# Patient Record
Sex: FEMALE | Race: BLACK OR AFRICAN AMERICAN
[De-identification: names, ages, dates, MRNs, and addresses within clinical notes are randomized per-mention and may not be internally consistent; named-entity substitution may affect disease eponyms.]

---

## 2019-09-30 ENCOUNTER — HOSPITAL ENCOUNTER (INPATIENT)
Dept: HOSPITAL 74 - YASAS | Age: 64
LOS: 10 days | Discharge: HOME | DRG: 895 | End: 2019-10-10
Attending: NEUROMUSCULOSKELETAL MEDICINE & OMM | Admitting: NEUROMUSCULOSKELETAL MEDICINE & OMM
Payer: COMMERCIAL

## 2019-09-30 VITALS — BODY MASS INDEX: 28.2 KG/M2

## 2019-09-30 DIAGNOSIS — R05: ICD-10-CM

## 2019-09-30 DIAGNOSIS — Z91.013: ICD-10-CM

## 2019-09-30 DIAGNOSIS — F32.9: ICD-10-CM

## 2019-09-30 DIAGNOSIS — F10.20: Primary | ICD-10-CM

## 2019-09-30 DIAGNOSIS — M54.5: ICD-10-CM

## 2019-09-30 DIAGNOSIS — I10: ICD-10-CM

## 2019-09-30 DIAGNOSIS — M25.561: ICD-10-CM

## 2019-09-30 DIAGNOSIS — F14.20: ICD-10-CM

## 2019-09-30 DIAGNOSIS — F12.20: ICD-10-CM

## 2019-09-30 DIAGNOSIS — G89.29: ICD-10-CM

## 2019-09-30 DIAGNOSIS — F11.20: ICD-10-CM

## 2019-09-30 DIAGNOSIS — F17.210: ICD-10-CM

## 2019-09-30 PROCEDURE — HZ42ZZZ GROUP COUNSELING FOR SUBSTANCE ABUSE TREATMENT, COGNITIVE-BEHAVIORAL: ICD-10-PCS | Performed by: ALLERGY & IMMUNOLOGY

## 2019-09-30 RX ADMIN — Medication SCH MG: at 21:33

## 2019-09-30 NOTE — HP
CIWA Score





- Admission Criteria


[facility] Guidelines: Admission for Medically Managed Detox: 


Requires at least one of the followin. CIWA greater than 12


2. Seizures within the past 24 hours


3. Delirium tremens within the past 24 hours


4. Hallucinations within the past 24 hours


5. Acute intervention needed for co  occurring medical disorder


6. Acute intervention needed for co  occurring psychiatric disorder


7. Severe withdrawal that cannot be handled at a lower level of care (continued


    vomiting, continued diarrhea, abnormal vital signs) requiring intravenous


    medication and/or fluids


8. Pregnancy








Admission ROS [facility]





- HPI


Chief Complaint: 





Here for rehab. To get stable. 


Allergies/Adverse Reactions: 


 Allergies











Allergy/AdvReac Type Severity Reaction Status Date / Time


 


Fish Containing Products Allergy Severe Difficulty Verified 19 14:39





   Breathing  


 


No Known Drug Allergies Allergy   Verified 19 17:19











History of Present Illness: 





First admission for this 63 yo who presents w/ a hx of poly-substance abuse, 

post 5 day detox at [facility] discharged today and seeking rehab.





CXRay in [facility]: Neg - 2019.





Heroin use began at age 62. Nasal. Was using about 10 bags/day. Last use . 


Crack/Cocaine use began at age 31. Smoked daily. Last use 19. 


Marijuana use began at age 17. Smoked daily. Last use 19. 


Alcohol use began at age 7. Was drinking 1 qt liquor daily. Last use 19. 





Nicotine use began at age [age]. Smokes 1PPD. 








Denies hx seizures, blackouts, or overdoses. Has a Narcan kit.





PMHx: HTN: Herniated discs w/ LBP; (R) knee pain. 





MHHx: Anxiety and Depression. Denies thoughts of harming self or others. 

Encouraged to make an appt w/ MH Provider while in rehab for follow-up post 

discharge.





SHx: Undomiciled. Retired. 














Patient Name: Daina Paige YOB: 1955


 


Address: [address] [place] [address] Sex: Female














 Rx Written Rx Dispensed Drug Quantity Days Supply Prescriber Name  


 


10/09/2018 10/25/2018 oxycodone hcl 20 mg tablet  90 30 Wero Mello MD  








Search Terms: Daina Paige, 1955 


Search Date: 2019 05:08:38 PM 


States Searched: [place], [place], [place], [place], [place], [place], [place] 


The Drug Utilization Report below displays the controlled substance 

prescriptions, if any, that were dispensed in the indicated state(s). The 

information displayed on this report is compiled from requests submitted to 

other states' PMPs, and accurately reflects the information as returned by 

them. Blank fields indicate data not provided by other state.


This report was requested by: Krissy Valles | Reference #: [id number] 








Exam Limitations: No Limitations





- Ebola screening


Have you traveled outside of the country in the last 21 days: No


Have you had contact with anyone from an Ebola affected area: No


Have you been sick,other than usual withdrawal symptoms: No


Do you have a fever: No





- Review of Systems


Constitutional: Diaphoresis (At night), Changes in sleep (Difficulty staying 

asleep)


EENT: reports: Blurred Vision, Dental Problems (Missing teeth. No pain. Chews 

and swallows ok.), Other (Post-nasal drip)


Respiratory: reports: No Symptoms reported


Cardiac: reports: No Symptoms Reported


GI: reports: Indigestion (Heart burn w/ certain foods)


: reports: No Symptoms Reported


Musculoskeletal: reports: Back Pain (Chronic LB tight pain. Discomfot ="6/10

"Increases w/ standing and turning in certain positions. Improves w/ resting.), 

Joint Pain (Hx: (R) knee, ususally sharp pain: Now is a "5". Increases w/ 

standing. Improves w/ laying down.), Other (Uses a cane for support.)


Integumentary: reports: No Symptoms Reported


Neuro: reports: Unsteady Gait (Uses a cane for support.)


Endocrine: reports: No Symptoms Reported


Hematology: reports: No Symptoms Reported


Psychiatric: reports: Judgement Intact, Orientated x3, Anxious, Depressed (

Denies thoughts of harming self or others)





Patient History





- PPD History


Previous Implant?: Yes


Documented Results: Negative w/o proof


Implanted On Prior [facility] Admission?: No


PPD to be Administered?: Yes





- Reproductive History


Patient is a Female of Child Bearing Age (11 -55 yrs old): No





- Smoking Cessation


Smoking history: Current every day smoker


Have you smoked in the past 12 months: Yes


Aproximately how many cigarettes per day: 20


Hx Chewing Tobacco Use: Yes


Initiated information on smoking cessation: No


'Breaking Loose' booklet given: 19





- Substance & Tx. History


Hx Alcohol Use: Yes


Hx Substance Use: Yes


Substance Use Type: Alcohol, Cocaine, Heroin, Marijuana


Hx Substance Use Treatment: Yes (detox, rehabs, past MMTP (5 mths ago))





- Substances abused


  ** Alcohol


Substance route: Oral


Frequency: Daily


Amount used: 1 qt vodka


Age of first use: 7


Date of last use: 19





  ** Crack


Substance route: Smoking


Frequency: Daily


Amount used: $100


Age of first use: 31


Date of last use: 19





  ** Marijuana/Hashish


Substance route: Smoking


Frequency: Daily


Amount used: $25


Age of first use: 17


Date of last use: 19





  ** Heroin


Substance route: Inhalation


Frequency: No use in 30 days


Amount used: 10 bags


Age of first use: 62


Date of last use: 19





Admission Physical Exam [facility]





- Vital Signs


Vital Signs: 


 Vital Signs - 24 hr











  19





  14:44


 


Temperature 97.9 F


 


Pulse Rate 64


 


Respiratory 18





Rate 


 


Blood Pressure 147/72














- Physical


General Appearance: Yes: Nourished, Anxious


HEENTM: Yes: EOMI, Hearing grossly Normal, Normal ENT Inspection, Normocephalic

, Normal Voice, KRISTINA, Pharynx Normal


Respiratory: Yes: Lungs Clear (Pulse Ox = 99 %), Normal Breath Sounds, No 

Respiratory Distress, Other (Cough productive of thick grayish phlegm.)


Neck: Yes: No masses,lesions,Nodules, Supple


Breast: Yes: Breast Exam Deferred


Cardiology: Yes: Regular Rhythm, S1, S2, Bradycardia (HR: 56)


Abdominal: Yes: Non Tender, Soft, Increased Bowel Sounds


Genitourinary: Yes: Within Normal Limits


Back: Yes: Normal Inspection


Musculoskeletal: Yes: Gait Steady (w/ use of cane), Joint Stiffness ((R) knee 

stiffness. No swelling.)


Extremities: Yes: Normal Capillary Refill


Neurological: Yes: CNs II-XII NML intact, Fully Oriented, Alert, Motor Strength 

5/5, Normal Mood/Affect, Normal Response


Integumentary: Yes: Normal Color, Dry, Warm


Lymphatic: Yes: Within Normal Limits





- Diagnostic


(1) Heroin use disorder, moderate, in early remission


Current Visit: Yes   Status: Acute   





(2) Alcohol use disorder, moderate, in early remission


Current Visit: Yes   Status: Acute   





(3) Cocaine use disorder, moderate, in early remission


Current Visit: Yes   Status: Acute   





(4) Moderate cannabis dependence in early remission


Current Visit: Yes   Status: Acute   





(5) Nicotine dependence, uncomplicated


Current Visit: Yes   Status: Chronic   


Qualifiers: 


   Nicotine product type: cigarettes   Qualified Code(s): F17.210 - Nicotine 

dependence, cigarettes, uncomplicated   





(6) Essential (primary) hypertension


Current Visit: Yes   Status: Acute   





(7) History of low back pain


Current Visit: Yes   Status: Chronic   





(8) Cough


Current Visit: Yes   Status: Chronic   Comment: Pulse Ox = 99%. Afebrile. No SOB

/wheeze.    





(9) Chronic pain of right knee


Current Visit: Yes   Status: Chronic   





Cleared for Admission [facility]





- Detox or Rehab


Claeared for Rehab Admission: Yes





Breathalyzer





- Breathalyzer


Breathalyzer: 0





Urine Drug Screen





- Test Device


Lot number: [id number]


Expiration date: 21





- Control


Is test valid?: Yes





- Results


Drug screen NEGATIVE: No


Urine drug screen results: BZO-Benzodiazepines





Inpatient Rehab Admission





- Rehab Decision to Admit


Inpatient rehab admission?: Yes





- Initial Determination


Are CD services needed?: Yes


Free of communicable disease: Yes


Not in need of hospitalization: Yes





- Rehab Admission Criteria


Previous failed treatment: Yes


Poor recovery environment: Yes


Comorbidities: Yes


Lacks judgement: Yes


Patient is meeting Inpatient Rehab admission criteria:: Yes

## 2019-10-01 LAB
ALBUMIN SERPL-MCNC: 4 G/DL (ref 3.4–5)
ALP SERPL-CCNC: 60 U/L (ref 45–117)
ALT SERPL-CCNC: 50 U/L (ref 13–61)
ANION GAP SERPL CALC-SCNC: 6 MMOL/L (ref 8–16)
APPEARANCE UR: CLEAR
AST SERPL-CCNC: 29 U/L (ref 15–37)
BILIRUB SERPL-MCNC: 0.6 MG/DL (ref 0.2–1)
BILIRUB UR STRIP.AUTO-MCNC: NEGATIVE MG/DL
BUN SERPL-MCNC: 14.8 MG/DL (ref 7–18)
CALCIUM SERPL-MCNC: 9.2 MG/DL (ref 8.5–10.1)
CHLORIDE SERPL-SCNC: 101 MMOL/L (ref 98–107)
CO2 SERPL-SCNC: 31 MMOL/L (ref 21–32)
COLOR UR: YELLOW
CREAT SERPL-MCNC: 0.6 MG/DL (ref 0.55–1.3)
DEPRECATED RDW RBC AUTO: 13.7 % (ref 11.6–15.6)
GLUCOSE SERPL-MCNC: 77 MG/DL (ref 74–106)
HCT VFR BLD CALC: 40.9 % (ref 32.4–45.2)
HGB BLD-MCNC: 14.1 GM/DL (ref 10.7–15.3)
KETONES UR QL STRIP: NEGATIVE
LEUKOCYTE ESTERASE UR QL STRIP.AUTO: NEGATIVE
MCH RBC QN AUTO: 33.5 PG (ref 25.7–33.7)
MCHC RBC AUTO-ENTMCNC: 34.4 G/DL (ref 32–36)
MCV RBC: 97.2 FL (ref 80–96)
NITRITE UR QL STRIP: NEGATIVE
PH UR: 7.5 [PH] (ref 5–8)
PLATELET # BLD AUTO: 297 K/MM3 (ref 134–434)
PMV BLD: 7.1 FL (ref 7.5–11.1)
POTASSIUM SERPLBLD-SCNC: 5 MMOL/L (ref 3.5–5.1)
PROT SERPL-MCNC: 7.4 G/DL (ref 6.4–8.2)
PROT UR QL STRIP: NEGATIVE
PROT UR QL STRIP: NEGATIVE
RBC # BLD AUTO: 4.2 M/MM3 (ref 3.6–5.2)
SODIUM SERPL-SCNC: 138 MMOL/L (ref 136–145)
SP GR UR: 1.01 (ref 1.01–1.03)
UROBILINOGEN UR STRIP-MCNC: 0.2 MG/DL (ref 0.2–1)
WBC # BLD AUTO: 7.4 K/MM3 (ref 4–10)

## 2019-10-01 RX ADMIN — GUAIFENESIN SCH ML: 100 SOLUTION ORAL at 23:06

## 2019-10-01 RX ADMIN — HYDROXYZINE PAMOATE PRN MG: 25 CAPSULE ORAL at 15:18

## 2019-10-01 RX ADMIN — GUAIFENESIN SCH: 100 SOLUTION ORAL at 00:49

## 2019-10-01 RX ADMIN — Medication SCH TAB: at 09:40

## 2019-10-01 RX ADMIN — GUAIFENESIN SCH ML: 100 SOLUTION ORAL at 12:05

## 2019-10-01 RX ADMIN — Medication SCH APPLIC: at 18:26

## 2019-10-01 RX ADMIN — GUAIFENESIN SCH ML: 100 SOLUTION ORAL at 18:29

## 2019-10-01 RX ADMIN — NICOTINE SCH MG: 14 PATCH, EXTENDED RELEASE TRANSDERMAL at 09:40

## 2019-10-01 RX ADMIN — Medication SCH MG: at 21:17

## 2019-10-01 RX ADMIN — GUAIFENESIN SCH: 100 SOLUTION ORAL at 06:40

## 2019-10-01 RX ADMIN — Medication PRN MG: at 21:17

## 2019-10-01 NOTE — EKG
Test Reason : 

Blood Pressure : ***/*** mmHG

Vent. Rate : 053 BPM     Atrial Rate : 053 BPM

   P-R Int : 152 ms          QRS Dur : 086 ms

    QT Int : 484 ms       P-R-T Axes : 058 059 059 degrees

   QTc Int : 454 ms

 

SINUS BRADYCARDIA

OTHERWISE NORMAL ECG

NO PREVIOUS ECGS AVAILABLE

Confirmed by Jace Starks MD ([id number]) on 10/1/2019 10:14:08 AM

 

Referred By:             Confirmed By:Jace Starks MD
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
## 2019-10-01 NOTE — PN
[facility] Progress Note


Note: 





Pt has a h/o HTN. Was not taking medications for the last 2 years- thinks it 

was norvasc. Was also on Lisinopril in the past- lost weight and so had meds 

adjusted.


will start on Norvasc 5mg and go up on dose and will add other meds as needed





 Vital Signs - 24 hr











  09/30/19 10/01/19 10/01/19





  14:44 00:30 03:30


 


Temperature 97.9 F  


 


Pulse Rate 64  


 


Respiratory 18 18 18





Rate   


 


Blood Pressure 147/72  














  10/01/19 10/01/19





  06:38 06:54


 


Temperature 98.3 F 98.3 F


 


Pulse Rate 54 L 54 L


 


Respiratory 18 18





Rate  


 


Blood Pressure 170/80 170/80

## 2019-10-02 LAB
RPR: (no result)
TREPONEMA ANTIBODY: REACTIVE

## 2019-10-02 RX ADMIN — Medication PRN MG: at 21:20

## 2019-10-02 RX ADMIN — Medication SCH TAB: at 09:38

## 2019-10-02 RX ADMIN — NICOTINE SCH: 14 PATCH, EXTENDED RELEASE TRANSDERMAL at 09:38

## 2019-10-02 RX ADMIN — GUAIFENESIN SCH: 100 SOLUTION ORAL at 06:38

## 2019-10-02 RX ADMIN — Medication SCH MG: at 21:19

## 2019-10-02 RX ADMIN — SERTRALINE HYDROCHLORIDE SCH MG: 50 TABLET ORAL at 09:39

## 2019-10-02 RX ADMIN — Medication SCH: at 09:38

## 2019-10-02 RX ADMIN — HYDROXYZINE PAMOATE PRN MG: 25 CAPSULE ORAL at 21:19

## 2019-10-02 RX ADMIN — AMLODIPINE BESYLATE SCH MG: 10 TABLET ORAL at 09:39

## 2019-10-03 RX ADMIN — NICOTINE SCH: 14 PATCH, EXTENDED RELEASE TRANSDERMAL at 09:43

## 2019-10-03 RX ADMIN — AMLODIPINE BESYLATE SCH MG: 10 TABLET ORAL at 09:42

## 2019-10-03 RX ADMIN — SERTRALINE HYDROCHLORIDE SCH MG: 50 TABLET ORAL at 09:43

## 2019-10-03 RX ADMIN — Medication PRN MG: at 21:06

## 2019-10-03 RX ADMIN — Medication SCH MG: at 21:06

## 2019-10-03 RX ADMIN — Medication SCH: at 09:42

## 2019-10-03 RX ADMIN — HYDROXYZINE PAMOATE PRN MG: 25 CAPSULE ORAL at 21:05

## 2019-10-03 RX ADMIN — Medication SCH TAB: at 09:42

## 2019-10-04 RX ADMIN — Medication SCH: at 09:37

## 2019-10-04 RX ADMIN — AMLODIPINE BESYLATE SCH MG: 10 TABLET ORAL at 09:36

## 2019-10-04 RX ADMIN — SERTRALINE HYDROCHLORIDE SCH MG: 50 TABLET ORAL at 09:36

## 2019-10-04 RX ADMIN — NICOTINE SCH: 14 PATCH, EXTENDED RELEASE TRANSDERMAL at 09:38

## 2019-10-04 RX ADMIN — Medication SCH MG: at 21:07

## 2019-10-04 RX ADMIN — HYDROXYZINE PAMOATE PRN MG: 25 CAPSULE ORAL at 21:07

## 2019-10-04 RX ADMIN — Medication PRN MG: at 21:07

## 2019-10-04 RX ADMIN — Medication SCH TAB: at 09:36

## 2019-10-05 RX ADMIN — NICOTINE SCH: 14 PATCH, EXTENDED RELEASE TRANSDERMAL at 09:40

## 2019-10-05 RX ADMIN — Medication SCH: at 09:40

## 2019-10-05 RX ADMIN — Medication PRN MG: at 21:46

## 2019-10-05 RX ADMIN — Medication SCH TAB: at 09:41

## 2019-10-05 RX ADMIN — SERTRALINE HYDROCHLORIDE SCH MG: 50 TABLET ORAL at 09:41

## 2019-10-05 RX ADMIN — AMLODIPINE BESYLATE SCH MG: 10 TABLET ORAL at 09:41

## 2019-10-05 RX ADMIN — IBUPROFEN PRN MG: 400 TABLET, FILM COATED ORAL at 09:57

## 2019-10-05 RX ADMIN — Medication SCH MG: at 21:45

## 2019-10-06 RX ADMIN — Medication PRN MG: at 21:47

## 2019-10-06 RX ADMIN — Medication SCH: at 09:48

## 2019-10-06 RX ADMIN — IBUPROFEN PRN MG: 400 TABLET, FILM COATED ORAL at 21:49

## 2019-10-06 RX ADMIN — AMLODIPINE BESYLATE SCH MG: 10 TABLET ORAL at 09:49

## 2019-10-06 RX ADMIN — SERTRALINE HYDROCHLORIDE SCH MG: 50 TABLET ORAL at 09:49

## 2019-10-06 RX ADMIN — NICOTINE SCH: 14 PATCH, EXTENDED RELEASE TRANSDERMAL at 09:49

## 2019-10-06 RX ADMIN — Medication SCH MG: at 21:47

## 2019-10-06 RX ADMIN — Medication SCH TAB: at 09:49

## 2019-10-07 RX ADMIN — SERTRALINE HYDROCHLORIDE SCH MG: 50 TABLET ORAL at 10:00

## 2019-10-07 RX ADMIN — IBUPROFEN PRN MG: 400 TABLET, FILM COATED ORAL at 21:29

## 2019-10-07 RX ADMIN — Medication SCH MG: at 21:28

## 2019-10-07 RX ADMIN — Medication SCH: at 09:59

## 2019-10-07 RX ADMIN — Medication SCH TAB: at 09:59

## 2019-10-07 RX ADMIN — NICOTINE SCH: 14 PATCH, EXTENDED RELEASE TRANSDERMAL at 09:59

## 2019-10-07 RX ADMIN — AMLODIPINE BESYLATE SCH MG: 10 TABLET ORAL at 09:59

## 2019-10-07 RX ADMIN — Medication PRN MG: at 21:28

## 2019-10-08 RX ADMIN — SERTRALINE HYDROCHLORIDE SCH MG: 50 TABLET ORAL at 09:58

## 2019-10-08 RX ADMIN — Medication PRN MG: at 21:27

## 2019-10-08 RX ADMIN — NICOTINE SCH: 14 PATCH, EXTENDED RELEASE TRANSDERMAL at 09:57

## 2019-10-08 RX ADMIN — Medication SCH MG: at 21:26

## 2019-10-08 RX ADMIN — HYDROXYZINE PAMOATE PRN MG: 25 CAPSULE ORAL at 21:27

## 2019-10-08 RX ADMIN — Medication SCH TAB: at 09:58

## 2019-10-08 RX ADMIN — Medication SCH: at 09:57

## 2019-10-08 RX ADMIN — AMLODIPINE BESYLATE SCH MG: 10 TABLET ORAL at 09:58

## 2019-10-09 RX ADMIN — AMLODIPINE BESYLATE SCH MG: 10 TABLET ORAL at 09:49

## 2019-10-09 RX ADMIN — Medication PRN MG: at 21:38

## 2019-10-09 RX ADMIN — Medication SCH MG: at 21:38

## 2019-10-09 RX ADMIN — IBUPROFEN PRN MG: 400 TABLET, FILM COATED ORAL at 09:51

## 2019-10-09 RX ADMIN — Medication SCH: at 09:50

## 2019-10-09 RX ADMIN — SERTRALINE HYDROCHLORIDE SCH MG: 50 TABLET ORAL at 09:49

## 2019-10-09 RX ADMIN — NICOTINE SCH MG: 14 PATCH, EXTENDED RELEASE TRANSDERMAL at 09:49

## 2019-10-09 RX ADMIN — LIDOCAINE SCH PATCH: 50 PATCH TOPICAL at 14:41

## 2019-10-09 RX ADMIN — METHOCARBAMOL SCH MG: 500 TABLET ORAL at 21:37

## 2019-10-09 RX ADMIN — ANALGESIC BALM SCH: 1.74; 4.06 OINTMENT TOPICAL at 22:54

## 2019-10-09 RX ADMIN — Medication SCH TAB: at 09:49

## 2019-10-09 RX ADMIN — METHOCARBAMOL SCH MG: 500 TABLET ORAL at 14:41

## 2019-10-10 VITALS — HEART RATE: 57 BPM | SYSTOLIC BLOOD PRESSURE: 117 MMHG | DIASTOLIC BLOOD PRESSURE: 66 MMHG

## 2019-10-10 VITALS — TEMPERATURE: 98 F

## 2019-10-10 RX ADMIN — NICOTINE SCH: 14 PATCH, EXTENDED RELEASE TRANSDERMAL at 09:53

## 2019-10-10 RX ADMIN — METHOCARBAMOL SCH MG: 500 TABLET ORAL at 06:39

## 2019-10-10 RX ADMIN — SERTRALINE HYDROCHLORIDE SCH MG: 50 TABLET ORAL at 09:52

## 2019-10-10 RX ADMIN — LIDOCAINE SCH PATCH: 50 PATCH TOPICAL at 09:51

## 2019-10-10 RX ADMIN — Medication SCH TAB: at 09:52

## 2019-10-10 RX ADMIN — AMLODIPINE BESYLATE SCH MG: 10 TABLET ORAL at 09:52

## 2019-10-10 RX ADMIN — Medication SCH APPLIC: at 09:51

## 2019-10-10 RX ADMIN — ANALGESIC BALM SCH APPLIC: 1.74; 4.06 OINTMENT TOPICAL at 09:51

## 2019-10-10 RX ADMIN — HYDROXYZINE PAMOATE PRN MG: 25 CAPSULE ORAL at 09:53

## 2019-10-10 NOTE — DS
[facility] Discharge Summary





- [facility] Discharge Summary


Admission Date: 09/30/19


Discharge Date: 10/10/19





- History


Present History: Alcohol dependence, Cocaine dependence, Opioid dependence


Pertinent Past History: 


History of Present Illness: 





65 yo w/ a hx of poly-substance abuse,.





CXRay in [facility] Hosp: Neg - 9/2019.





Heroin use began at age 62. Nasal. Was using about 10 bags/day. Last use 8/29/ 19. 


Crack/Cocaine use began at age 31. Smoked daily. Last use 9/23/19. 


Marijuana use began at age 17. Smoked daily. Last use 9/25/19. 


Alcohol use began at age 7. Was drinking 1 qt liquor daily. Last use 9/24/19. 


Nicotine use began at age [age]. Smokes 1PPD. 


Denies hx seizures, blackouts, or overdoses. Has a Narcan kit.


PMHx: HTN: Herniated discs w/ LBP; (R) knee pain. 


MHHx: Anxiety and Depression. Denies thoughts of harming self or others. 


SHx: Undomiciled. Retired. 








- Discharge Physical Exam


Vital Signs: 


 Vital Signs











Temperature  98.0 F   10/10/19 07:05


 


Pulse Rate  56 L  10/10/19 07:05


 


Respiratory Rate  16   10/10/19 07:05


 


Blood Pressure  148/73   10/10/19 07:05


 


O2 Sat by Pulse Oximetry (%)      











Pertinent Admission Physical Exam Findings: 


- Physical


General Appearance: No apparent distress


HEENTM:  EOMI, Normocephalic, KRISTINA, 


Respiratory:  Lungs Clear 


Neck:  Supple


Cardiology:S1, S2,


Abdominal:  Non Tender, Soft, +Bowel Sounds


Musculoskeletal: Gait Steady w/ use of cane


Neurological: CNs II-XII NML intact,Motor Strength 5/5


Integumentary: Color consistent throughout trunk and extremities, good skin 

turgor








- Treatment


Discharge Condition: Outpatient referral accepted (Patient has referral to 

[facility]. Medically stable for discharge.)


Hospital Course: 


patient attended groups, had 1:1 with her counselor, was seen by psychiatric 

provider, and was adherent to her treatment plan and medication regimen. 

Patient had no acute or urgent medical problems during her time in rehab. 








- Medication


Discharge Medications: 


Ambulatory Orders





Sertraline HCl [Zoloft -] 50 mg PO DAILY 09/30/19 


Zolpidem Tartrate [Ambien] 10 mg PO PRN 09/30/19 


Multivitamin [One-Daily Multi-Vitamin] 1 tab PO DAILY #30 tablet 10/10/19 











- Medication-Assisted Treatment (MAT)


Medication-Assisted Treatment (MAT): No





- Discharge Instructions


Diet, activity, other medical instructions: 





Diet: as tolerated





Activity: as tolerated





Other medical instructions: Please keep aftercare referral. 








- Diagnosis


(1) Alcohol dependence


Current Visit: Yes   Status: Chronic   


Qualifiers: 


   Substance use status: uncomplicated   Qualified Code(s): F10.20 - Alcohol 

dependence, uncomplicated   





(2) Cocaine use disorder, moderate, in early remission


Current Visit: Yes   Status: Chronic   





(3) Heroin use disorder, moderate, in early remission


Current Visit: Yes   Status: Chronic   





- Follow-up Referral


Minutes to complete discharge: 20





- AMA


Did Patient Leave Against Medical Advice: No